# Patient Record
Sex: FEMALE | Race: WHITE | NOT HISPANIC OR LATINO | Employment: UNEMPLOYED | URBAN - METROPOLITAN AREA
[De-identification: names, ages, dates, MRNs, and addresses within clinical notes are randomized per-mention and may not be internally consistent; named-entity substitution may affect disease eponyms.]

---

## 2020-07-13 ENCOUNTER — OFFICE VISIT (OUTPATIENT)
Dept: ORTHOPEDIC SURGERY | Facility: CLINIC | Age: 65
End: 2020-07-13

## 2020-07-13 VITALS — HEIGHT: 64 IN | BODY MASS INDEX: 31.07 KG/M2 | OXYGEN SATURATION: 98 % | WEIGHT: 182 LBS | HEART RATE: 95 BPM

## 2020-07-13 DIAGNOSIS — S92.412A DISPLACED FRACTURE OF PROXIMAL PHALANX OF LEFT GREAT TOE, INITIAL ENCOUNTER FOR CLOSED FRACTURE: Primary | ICD-10-CM

## 2020-07-13 DIAGNOSIS — S92.525A CLOSED NONDISPLACED FRACTURE OF MIDDLE PHALANX OF LESSER TOE OF LEFT FOOT, INITIAL ENCOUNTER: ICD-10-CM

## 2020-07-13 PROCEDURE — 99204 OFFICE O/P NEW MOD 45 MIN: CPT | Performed by: ORTHOPAEDIC SURGERY

## 2020-07-13 RX ORDER — DIMETHYL FUMARATE 240 MG/1
CAPSULE ORAL
COMMUNITY
Start: 2020-07-10

## 2020-07-13 RX ORDER — ATORVASTATIN CALCIUM 40 MG/1
40 TABLET, FILM COATED ORAL DAILY
COMMUNITY

## 2020-07-13 RX ORDER — HYDROCODONE BITARTRATE AND ACETAMINOPHEN 5; 325 MG/1; MG/1
1 TABLET ORAL EVERY 6 HOURS PRN
COMMUNITY
Start: 2020-07-07

## 2020-07-13 NOTE — PROGRESS NOTES
[facility] [place] [facility] [place] [facility] [place]    Subjective     Chief Complaint   Patient presents with   • Left Foot - Pain        HPI  Peyton Hills is a 65 y.o. female who presents with left foot pain.  Onset: mechanical fall. The issue has been ongoing for 1 week(s). Pain is a 3/10 on the pain scale. Pain is described as dull and shooting. Associated symptoms include pain. The pain is worse with walking and standing; resting and pain medication and/or NSAID improve the pain. Previous treatments have included: bracing and knee scooter.    I have reviewed the following portions of the patient's history:History of Present Illness          Past Medical History:   Diagnosis Date   • GERD (gastroesophageal reflux disease)    • High cholesterol    • MS (multiple sclerosis) (CMS/[facility])       Past Surgical History:   Procedure Laterality Date   •  SECTION     • DILATATION AND EVACUATION     • FOOT SURGERY Left     needle removal   • UTERINE SEPTUM REMOVAL        Family History   Problem Relation Age of Onset   • Heart disease Mother      Social History     Socioeconomic History   • Marital status:      Spouse name: Not on file   • Number of children: Not on file   • Years of education: Not on file   • Highest education level: Not on file   Tobacco Use   • Smoking status: Former Smoker     Types: Cigarettes     Last attempt to quit:      Years since quittin.5   • Smokeless tobacco: Never Used   Substance and Sexual Activity   • Alcohol use: Yes     Comment: occasional    • Drug use: Never   • Sexual activity: Defer      Current Outpatient Medications on File Prior to Visit   Medication Sig Dispense Refill   • atorvastatin (LIPITOR) 40 MG tablet Take 40 mg by mouth Daily.     • HYDROcodone-acetaminophen (NORCO) 5-325 MG per tablet Take 1 tablet by mouth Every 6 (Six) Hours As Needed. for pain     • TECFIDERA 240 MG capsule delayed-release        No current facility-administered medications on file prior  "to visit.       Allergies   Allergen Reactions   • Contrast Dye Hives   • Sulfa Antibiotics Hives        The following portions of the patient's history were reviewed and updated as appropriate: allergies, current medications, past family history, past medical history, past social history, past surgical history and problem list.    Review of Systems   Constitutional: Positive for activity change, chills and fatigue.   HENT: Negative.    Eyes: Positive for itching.   Respiratory: Negative.    Cardiovascular: Negative.    Gastrointestinal: Negative.    Endocrine: Positive for heat intolerance.   Genitourinary: Negative.    Musculoskeletal: Positive for arthralgias.   Skin: Negative.    Allergic/Immunologic: Positive for environmental allergies and immunocompromised state.   Neurological: Negative.    Hematological: Negative.    Psychiatric/Behavioral: Negative.         Objective      Physical Exam  Pulse 95   Ht 163.5 cm (64.37\")   Wt 82.6 kg (182 lb)   SpO2 98%   BMI 30.88 kg/m²     Body mass index is 30.88 kg/m².    GENERAL APPEARANCE: awake, alert & oriented x 3, in no acute distress and well developed, well nourished  PSYCH: normal mood and affect  LUNGS:  breathing nonlabored, no wheezing  EYES: sclera anicteric, pupils equal  CARDIOVASCULAR: palpable pulses dorsalis pedis, palpable posterior tibial bilaterally. Capillary refill less than 2 seconds  INTEGUMENTARY: skin intact, no clubbing, cyanosis  NEUROLOGIC:  Normal Sensation and reflexes       Ortho Exam  Left  swollen great toe.  There is bruising.  The skin is intact.  Ankle has full motion strength.  Minimal midfoot swelling.  Compartments soft.  Skin intact.  Unable to bear weight secondary to toe pain.    Imaging/Studies  Imaging Results (Last 7 Days)     ** No results found for the last 168 hours. **      I viewed her x-rays.  Radiographs show displaced great toe proximal phalanx fracture and second toe little phalanx " fracture    Assessment/Plan        ICD-10-CM ICD-9-CM   1. Displaced fracture of proximal phalanx of left great toe, initial encounter for closed fracture S92.412A 826.0   2. Closed nondisplaced fracture of middle phalanx of lesser toe of left foot, initial encounter S92.525A 826.0     Her ankle is feeling better.  Treatment plan is a cast shoe with follow-up in 4 weeks.  She may advance weightbearing as tolerated.  Continue ice and elevation.  Medical Decision Making  Management Options : over-the-counter medicine and close treatment of fracture or dislocation  Data/Risk: radiology tests and independent visualization of imaging, lab tests, or EMG/NCV    Norm Jerome MD  07/13/20  14:21         EMR Dragon/Transcription disclaimer:  Much of this encounter note is an electronic transcription of spoken language to printed text. Electronic transcription of spoken language may permit erroneous, or at times, nonsensical words or phrases to be inadvertently transcribed. Although I have reviewed the note for such errors, some may still exist.

## 2020-08-17 ENCOUNTER — OFFICE VISIT (OUTPATIENT)
Dept: ORTHOPEDIC SURGERY | Facility: CLINIC | Age: 65
End: 2020-08-17

## 2020-08-17 VITALS — WEIGHT: 177 LBS | HEIGHT: 64 IN | BODY MASS INDEX: 30.22 KG/M2 | HEART RATE: 97 BPM | OXYGEN SATURATION: 97 %

## 2020-08-17 DIAGNOSIS — S92.412A DISPLACED FRACTURE OF PROXIMAL PHALANX OF LEFT GREAT TOE, INITIAL ENCOUNTER FOR CLOSED FRACTURE: ICD-10-CM

## 2020-08-17 DIAGNOSIS — M25.562 ACUTE PAIN OF LEFT KNEE: ICD-10-CM

## 2020-08-17 DIAGNOSIS — Z09 FRACTURE FOLLOW-UP: Primary | ICD-10-CM

## 2020-08-17 DIAGNOSIS — S92.525A CLOSED NONDISPLACED FRACTURE OF MIDDLE PHALANX OF LESSER TOE OF LEFT FOOT, INITIAL ENCOUNTER: ICD-10-CM

## 2020-08-17 PROCEDURE — 99212 OFFICE O/P EST SF 10 MIN: CPT | Performed by: ORTHOPAEDIC SURGERY

## 2020-08-17 NOTE — PROGRESS NOTES
[facility] Note    Subjective     Chief Complaint   Patient presents with   • Follow-up     5 weeks follow up for Displaced fracture of proximal phalanx of left great toe andClosed nondisplaced fracture of middle phalanx of lesser toe of left foot        HPI  Peyton Hills is a 65 y.o. female.  She is follow-up for left foot toe fractures.  Her foot feels great but her knee still hurts.  Her knee hurts on the medial aspect.    Past Medical History:   Diagnosis Date   • GERD (gastroesophageal reflux disease)    • High cholesterol    • MS (multiple sclerosis) (CMS/[facility])       Past Surgical History:   Procedure Laterality Date   •  SECTION     • DILATATION AND EVACUATION     • FOOT SURGERY Left     needle removal   • UTERINE SEPTUM REMOVAL        Family History   Problem Relation Age of Onset   • Heart disease Mother      Social History     Socioeconomic History   • Marital status:      Spouse name: Not on file   • Number of children: Not on file   • Years of education: Not on file   • Highest education level: Not on file   Tobacco Use   • Smoking status: Former Smoker     Types: Cigarettes     Last attempt to quit:      Years since quittin.6   • Smokeless tobacco: Never Used   Substance and Sexual Activity   • Alcohol use: Yes     Comment: occasional    • Drug use: Never   • Sexual activity: Defer      Current Outpatient Medications on File Prior to Visit   Medication Sig Dispense Refill   • atorvastatin (LIPITOR) 40 MG tablet Take 40 mg by mouth Daily.     • Cholecalciferol (VITAMIN D3 PO) Vitamin D3     • HYDROcodone-acetaminophen (NORCO) 5-325 MG per tablet Take 1 tablet by mouth Every 6 (Six) Hours As Needed. for pain     • Pantoprazole Sodium (PROTONIX PO) Protonix   Daily     • TECFIDERA 240 MG capsule delayed-release        No current facility-administered medications on file prior to visit.       Allergies   Allergen Reactions   • Contrast Dye Hives   • Sulfa  "Antibiotics Hives        The following portions of the patient's history were reviewed and updated as appropriate: allergies, current medications, past family history, past medical history, past social history, past surgical history and problem list.    Review of Systems   Constitutional: Negative.    HENT: Negative.    Eyes: Negative.    Respiratory: Negative.    Cardiovascular: Negative.    Gastrointestinal: Negative.    Endocrine: Negative.    Genitourinary: Negative.    Musculoskeletal: Positive for arthralgias.   Skin: Negative.    Allergic/Immunologic: Positive for environmental allergies and immunocompromised state.   Neurological: Negative.    Hematological: Negative.    Psychiatric/Behavioral: Negative.         Objective      Physical Exam  Pulse 97   Ht 163.5 cm (64.37\")   Wt 80.3 kg (177 lb)   SpO2 97%   BMI 30.03 kg/m²     Body mass index is 30.03 kg/m².    GENERAL APPEARANCE: awake, alert & oriented x 3, in no acute distress and well developed, well nourished  PSYCH: normal mood and affect  LUNGS:  breathing nonlabored, no wheezing  EYES: sclera anicteric, pupils equal  Toes have no swelling or tenderness.  She is tender over the medial femoral condyle.  No joint line tenderness stable varus valgus.  Full motion full gait and full strength.    Imaging/Studies  Imaging Results (Last 7 Days)     Procedure Component Value Units Date/Time    XR Foot 3+ View Left [[id number]] Resulted:  08/17/20 1322     Updated:  08/17/20 1328    Narrative:       Left Foot X-Ray  Indication: Pain  AP, Lateral, and Oblique views    Findings:  Healing of first and second toe fractures  No bony lesion  Normal soft tissues  Normal joint spaces    prior studies were available for comparison.              Assessment/Plan        ICD-10-CM ICD-9-CM   1. Fracture follow-up Z09 V67.4   2. Displaced fracture of proximal phalanx of left great toe, subsequent encounter for closed fracture with healing S92.412A 826.0   3. Closed " nondisplaced fracture of middle phalanx of lesser toe of left foot, subsequent encounter with healing S92.525A 826.0   4. Acute pain of left knee M25.562 719.46       Orders Placed This Encounter   Procedures   • XR Foot 3+ View Left      She will follow-up as needed.  Her toe fractures are healing very.  Her knee is a soft tissue injury and will heal slower.  She had no pre-existing knee problems.  Medical Decision Making  Management Options : over-the-counter medicine  Data/Risk: radiology tests and independent visualization of imaging, lab tests, or EMG/NCV    Norm Jerome MD  08/17/20  13:29         EMR Dragon/Transcription disclaimer:  Much of this encounter note is an electronic transcription of spoken language to printed text. Electronic transcription of spoken language may permit erroneous, or at times, nonsensical words or phrases to be inadvertently transcribed. Although I have reviewed the note for such errors, some may still exist.